# Patient Record
Sex: FEMALE | Race: WHITE | NOT HISPANIC OR LATINO | Employment: FULL TIME | URBAN - METROPOLITAN AREA
[De-identification: names, ages, dates, MRNs, and addresses within clinical notes are randomized per-mention and may not be internally consistent; named-entity substitution may affect disease eponyms.]

---

## 2024-07-11 ENCOUNTER — TELEPHONE (OUTPATIENT)
Dept: ENDOCRINOLOGY | Facility: CLINIC | Age: 34
End: 2024-07-11
Payer: COMMERCIAL

## 2024-09-30 ENCOUNTER — APPOINTMENT (OUTPATIENT)
Dept: ALLERGY | Facility: CLINIC | Age: 34
End: 2024-09-30
Payer: COMMERCIAL

## 2024-09-30 ENCOUNTER — LAB (OUTPATIENT)
Dept: LAB | Facility: LAB | Age: 34
End: 2024-09-30
Payer: COMMERCIAL

## 2024-09-30 VITALS
SYSTOLIC BLOOD PRESSURE: 108 MMHG | WEIGHT: 199 LBS | TEMPERATURE: 98.3 F | HEIGHT: 63 IN | OXYGEN SATURATION: 98 % | RESPIRATION RATE: 17 BRPM | BODY MASS INDEX: 35.26 KG/M2 | DIASTOLIC BLOOD PRESSURE: 68 MMHG | HEART RATE: 89 BPM

## 2024-09-30 DIAGNOSIS — H10.10 ALLERGIC CONJUNCTIVITIS AND RHINITIS, UNSPECIFIED LATERALITY: ICD-10-CM

## 2024-09-30 DIAGNOSIS — K90.49 FOOD INTOLERANCE: ICD-10-CM

## 2024-09-30 DIAGNOSIS — J30.9 ALLERGIC CONJUNCTIVITIS AND RHINITIS, UNSPECIFIED LATERALITY: ICD-10-CM

## 2024-09-30 DIAGNOSIS — Z87.898 HISTORY OF ITCHING: ICD-10-CM

## 2024-09-30 DIAGNOSIS — L50.9 URTICARIA: ICD-10-CM

## 2024-09-30 DIAGNOSIS — L50.9 URTICARIA: Primary | ICD-10-CM

## 2024-09-30 PROCEDURE — 99204 OFFICE O/P NEW MOD 45 MIN: CPT | Performed by: ALLERGY & IMMUNOLOGY

## 2024-09-30 PROCEDURE — 82785 ASSAY OF IGE: CPT

## 2024-09-30 PROCEDURE — 86003 ALLG SPEC IGE CRUDE XTRC EA: CPT

## 2024-09-30 PROCEDURE — 83520 IMMUNOASSAY QUANT NOS NONAB: CPT

## 2024-09-30 PROCEDURE — 36415 COLL VENOUS BLD VENIPUNCTURE: CPT

## 2024-09-30 RX ORDER — CETIRIZINE HYDROCHLORIDE 10 MG/1
1 TABLET ORAL DAILY
COMMUNITY

## 2024-09-30 RX ORDER — DICYCLOMINE HYDROCHLORIDE 10 MG/1
1 CAPSULE ORAL 3 TIMES DAILY
COMMUNITY
Start: 2024-09-20

## 2024-09-30 RX ORDER — PSYLLIUM HUSK 0.4 G
1 CAPSULE ORAL 3 TIMES DAILY
COMMUNITY
Start: 2024-07-10

## 2024-09-30 NOTE — PROGRESS NOTES
Patient ID: Kelsey Choe is a 34 y.o. female.     Chief Complaint: NPV her NP in [place] (Alivia Nelson).  She is her with her boyfriend today.  History Of Present Illness  Kelsey Choe is a 34 y.o. female with PMx allergy symptoms and concern for shrimp allergy presenting for consultation.       Food Allergy  Patient had a history of general blood panel due to GI upset and was told she had a shrimp allergy.  She has never had shrimp and she does not like seafood.  She was prescribed an epi pen. It is .    She also has lactose intolerance and avoids dairy for the most part.    Eczema/ Atopic Dermatitis  Hives in mid August-went to urgent care.  RX for steroids. Patient did not take. Hives went away after 36 hours.  The rash was NOT pruritic.    Asthma  No    Rhinoconjunctivitis  Congestion and rhinorrhea/sneeze spring  Takes Claritin daily for itch and allergy symptoms.  She does state that if she goes off her medication, her skin is itchy.      Drug Allergy   No    Insect Allergy   No    Infections  No history of frequent or recurrent infections    She does have a history of endocrine consult for thyroid nodules and had labs for this today at [facility].      Review of Systems    Pertinent positives and negatives have been assessed in the HPI. All other systems have been reviewed and are negative except as noted in the HPI.    Allergies  Lactose    Past Medical History  She has a past medical history of Personal history of other infectious and parasitic diseases.    Family History  No family history on file.    No history of food allergy or atopic disease in the family.    Surgical History  She has no past surgical history on file.    Social/Environmental History  She has no history on file for tobacco use, alcohol use, and drug use.    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  Dr. James and Juan Carlos.      MEDICATIONS  Current Outpatient Medications on File Prior to Visit   Medication Sig Dispense Refill    cetirizine (ZyrTEC) 10 mg tablet Take 1 tablet (10 mg) by mouth once daily.      dicyclomine (Bentyl) 10 mg capsule Take 1 capsule (10 mg) by mouth 3 times a day.      MetamuciL 0.4 gram capsule Take 1 capsule by mouth 3 times a day.       No current facility-administered medications on file prior to visit.         Physical Exam  Visit Vitals  /68   Pulse 89   Temp 36.8 °C (98.3 °F) (Temporal)   Resp 17   Ht 1.6 m (5' 3\")   Wt 90.3 kg (199 lb)   SpO2 98%   BMI 35.25 kg/m²   BSA 2 m²       Wt Readings from Last 1 Encounters:   09/30/24 90.3 kg (199 lb)       Physical Exam    General: Well appearing, no acute distress  Head: Normocephalic, atraumatic, neck supple without lymphadenopathy  Eyes: EOMI, non-injected  Nose: No nasal crease, nares patent, slightly boggy turbinates, minimal discharge  Throat: Normal dentition, no erythema  Heart: Regular rate and rhythm  Lungs: Clear to auscultation bilaterally, effort normal  Abdomen: Soft, non-tender, normal bowel sounds  Extremities: Moves all extremities symmetrically, no edema  Skin: No rashes/lesions  Psych: normal mood and affect    LAB RESULTS:  CBC:  Recent Labs     05/25/24  0921   WBC 5.6   HGB 15.1   HCT 44.1      MCV 87   EOSABS 0.18       CMP:  Recent Labs     05/25/24  0921      K 4.2      CO2 27   ANIONGAP 10   BUN 10   CREATININE 0.68   EGFR >90     Recent Labs     05/25/24  0921   ALBUMIN 4.3   ALKPHOS 53   ALT 23   AST 15   BILITOT 0.8         Recent Labs     05/25/24  0921   EOSABS 0.18       HEME/ENDO:  Recent Labs     05/25/24  0921   TSH 2.21     Assessment/Plan   Kelsey is a 33 yo with a history of pruritic skin in general, non pruritic rash in the past, abnormal lab for shrimp allergy anf concern for allergic rhinitis. She does not feel she can go off of her " antihistamine as she will be too itchy.  I will obtain labs and call results and then determine if additional testing was needed.    Karoline Trevizo, DO

## 2024-10-01 LAB
A ALTERNATA IGE QN: <0.1 KU/L
A FUMIGATUS IGE QN: <0.1 KU/L
BERMUDA GRASS IGE QN: <0.1 KU/L
BOXELDER IGE QN: <0.1 KU/L
C HERBARUM IGE QN: <0.1 KU/L
CALIF WALNUT POLN IGE QN: <0.1 KU/L
CAT DANDER IGE QN: <0.1 KU/L
CMN PIGWEED IGE QN: <0.1 KU/L
COMMON RAGWEED IGE QN: <0.1 KU/L
COTTONWOOD IGE QN: <0.1 KU/L
D FARINAE IGE QN: <0.1 KU/L
D PTERONYSS IGE QN: <0.1 KU/L
DOG DANDER IGE QN: <0.1 KU/L
ENGL PLANTAIN IGE QN: <0.1 KU/L
GOOSEFOOT IGE QN: <0.1 KU/L
JOHNSON GRASS IGE QN: <0.1 KU/L
KENT BLUE GRASS IGE QN: <0.1 KU/L
LONDON PLANE IGE QN: <0.1 KU/L
MT JUNIPER IGE QN: <0.1 KU/L
P NOTATUM IGE QN: <0.1 KU/L
PECAN/HICK TREE IGE QN: <0.1 KU/L
ROACH IGE QN: 0.22 KU/L
SALTWORT IGE QN: <0.1 KU/L
SHEEP SORREL IGE QN: <0.1 KU/L
SHRIMP IGE QN: 0.23 KU/L
SILVER BIRCH IGE QN: <0.1 KU/L
TIMOTHY IGE QN: <0.1 KU/L
TOTAL IGE SMQN RAST: 147 KU/L
WHITE ASH IGE QN: <0.1 KU/L
WHITE ELM IGE QN: <0.1 KU/L
WHITE MULBERRY IGE QN: <0.1 KU/L
WHITE OAK IGE QN: <0.1 KU/L

## 2024-10-03 LAB — TRYPTASE SERPL-MCNC: 14.9 UG/L

## 2024-10-09 DIAGNOSIS — Z91.013 SHRIMP ALLERGY: Primary | ICD-10-CM

## 2024-10-09 RX ORDER — EPINEPHRINE 0.3 MG/.3ML
0.3 INJECTION SUBCUTANEOUS AS NEEDED
Qty: 2 EACH | Refills: 1 | Status: SHIPPED | OUTPATIENT
Start: 2024-10-09 | End: 2024-11-08

## 2024-11-07 ENCOUNTER — APPOINTMENT (OUTPATIENT)
Dept: ALLERGY | Facility: CLINIC | Age: 34
End: 2024-11-07
Payer: COMMERCIAL

## 2024-11-07 VITALS
SYSTOLIC BLOOD PRESSURE: 122 MMHG | TEMPERATURE: 98.1 F | RESPIRATION RATE: 17 BRPM | DIASTOLIC BLOOD PRESSURE: 76 MMHG | BODY MASS INDEX: 35.08 KG/M2 | HEIGHT: 63 IN | OXYGEN SATURATION: 98 % | HEART RATE: 93 BPM | WEIGHT: 198 LBS

## 2024-11-07 DIAGNOSIS — R74.8 ELEVATED SERUM TRYPTASE: Primary | ICD-10-CM

## 2024-11-07 DIAGNOSIS — L50.9 URTICARIA: ICD-10-CM

## 2024-11-07 DIAGNOSIS — R19.7 DIARRHEA, UNSPECIFIED TYPE: ICD-10-CM

## 2024-11-07 DIAGNOSIS — Z91.013 SHRIMP ALLERGY: ICD-10-CM

## 2024-11-07 PROCEDURE — 99213 OFFICE O/P EST LOW 20 MIN: CPT | Performed by: ALLERGY & IMMUNOLOGY

## 2024-11-07 PROCEDURE — 3008F BODY MASS INDEX DOCD: CPT | Performed by: ALLERGY & IMMUNOLOGY

## 2024-11-07 NOTE — PROGRESS NOTES
Patient ID: Kelsey Choe is a 34 y.o. female.     Chief Complaint: follow up  History Of Present Illness  Kelsey Choe is a 34 y.o. female with PMx allergy symptoms,hives, concern for shrimp allergypresenting for follow up labs.         Food Allergy  Avoids shrimp-now has epi pen. Immunocap low positive    Eczema/ Atopic Dermatitis  Intermittent hives  No recent hives  Found to have slightly elevated tryptase  Does have history of GI distress, but not clear if ibs or other wise. Never saw GI for this.    Asthma  No    Rhinoconjunctivitis  Intermittent wihtout specific allergy on immunocap    Drug Allergy   no    Insect Allergy   No    Infections  No history of frequent or recurrent infections      Review of Systems    Pertinent positives and negatives have been assessed in the HPI. All other systems have been reviewed and are negative except as noted in the HPI.    Allergies  Lactose    Past Medical History  She has a past medical history of Personal history of other infectious and parasitic diseases.    Family History  No family history on file.      Surgical History  She has no past surgical history on file.    Social/Environmental History  She has no history on file for tobacco use, alcohol use, and drug use.    MEDICATIONS  Current Outpatient Medications on File Prior to Visit   Medication Sig Dispense Refill    cetirizine (ZyrTEC) 10 mg tablet Take 1 tablet (10 mg) by mouth once daily.      dicyclomine (Bentyl) 10 mg capsule Take 1 capsule (10 mg) by mouth 3 times a day.      EPINEPHrine (Epipen) 0.3 mg/0.3 mL injection syringe Inject 0.3 mL (0.3 mg) into the muscle if needed for anaphylaxis (shrimp allergy). Inject into UPPER, OUTER THIGH. Call 911 after use. 2 each 1    MetamuciL 0.4 gram capsule Take 1 capsule by mouth 3 times a day.       No current facility-administered medications on file prior to visit.         Physical Exam  Visit Vitals  /76   Pulse 93   Temp 36.7 °C (98.1 °F) (Temporal)  "  Resp 17   Ht 1.6 m (5' 3\")   Wt 89.8 kg (198 lb)   SpO2 98%   BMI 35.07 kg/m²   BSA 2 m²       Wt Readings from Last 1 Encounters:   11/07/24 89.8 kg (198 lb)       Physical Exam    General: Well appearing, no acute distress  Head: Normocephalic, atraumatic  Eyes: non-injected  Nose: No nasal crease, nares patent, minimal discharge  Throat: Normal dentition  Lungs: effort normal  Extremities: Moves all extremities symmetrically, no edema  Skin: No rashes/lesions  Psych: normal mood and affect    LAB RESULTS:  CBC:  Recent Labs     05/25/24  0921   WBC 5.6   HGB 15.1   HCT 44.1      MCV 87   EOSABS 0.18       CMP:  Recent Labs     05/25/24  0921      K 4.2      CO2 27   ANIONGAP 10   BUN 10   CREATININE 0.68   EGFR >90     Recent Labs     05/25/24 0921   ALBUMIN 4.3   ALKPHOS 53   ALT 23   AST 15   BILITOT 0.8       ALLERGY:   Lab Results   Component Value Date    ICIGE 147 09/30/2024    WHITEASH <0.10 09/30/2024    SILVERBIRCH <0.10 09/30/2024    BOXELDER <0.10 09/30/2024    MOUNTJUNIPER <0.10 09/30/2024    COTTONWOOD <0.10 09/30/2024    ELM <0.10 09/30/2024    MULBERRY <0.10 09/30/2024    PECANHICKORY <0.10 09/30/2024    MAPLESYCAMOR <0.10 09/30/2024    OAK <0.10 09/30/2024    BERMUDAGR <0.10 09/30/2024    JOHNSONGR <0.10 09/30/2024    BLUEGRASS <0.10 09/30/2024    TIMOTHYGRASS <0.10 09/30/2024     Lab Results   Component Value Date    LAMBQUART <0.10 09/30/2024    PIGWEED <0.10 09/30/2024    COMRAGWEED <0.10 09/30/2024    RUSSIANT <0.10 09/30/2024    SHEEPSOR <0.10 09/30/2024    PLANTAIN <0.10 09/30/2024    CATEPI <0.10 09/30/2024    DOGEPI <0.10 09/30/2024    ALTERNA <0.10 09/30/2024    CLADHERB <0.10 09/30/2024    ICA04 <0.10 09/30/2024    PENICILLIUM <0.10 09/30/2024    DERMFAR <0.10 09/30/2024    DERMPTE <0.10 09/30/2024    COCKR 0.22 (Equiv IgE) 09/30/2024     Lab Results   Component Value Date    WALNUT <0.10 09/30/2024    SHRIMP 0.23 (Equiv IgE) 09/30/2024     Recent Labs     " 09/30/24  1506   ICIGE 147     Recent Labs     05/25/24  0921   EOSABS 0.18     Recent Labs     09/30/24  1506   TRYPTASE 14.9*         HEME/ENDO:  Recent Labs     05/25/24  0921   TSH 2.21     Assessment/Plan   Kelsey is a 33 yo woman with history of gi distress, shrimp allergy, and elevated tryptase.  -we discussed allergy avoidance and need for epi pen  -referred to gi due to complaint of chronic diarrhea  -Will repeat tryptase in a few month and have follow up. We did discuss additional testing and dietary considerations.        Karoline Trevizo DO

## 2025-01-17 ENCOUNTER — LAB (OUTPATIENT)
Dept: LAB | Facility: LAB | Age: 35
End: 2025-01-17
Payer: COMMERCIAL

## 2025-01-17 ENCOUNTER — OFFICE VISIT (OUTPATIENT)
Dept: GASTROENTEROLOGY | Facility: CLINIC | Age: 35
End: 2025-01-17
Payer: COMMERCIAL

## 2025-01-17 VITALS
DIASTOLIC BLOOD PRESSURE: 84 MMHG | SYSTOLIC BLOOD PRESSURE: 132 MMHG | TEMPERATURE: 97.3 F | BODY MASS INDEX: 35.79 KG/M2 | HEIGHT: 63 IN | WEIGHT: 202 LBS | HEART RATE: 90 BPM

## 2025-01-17 DIAGNOSIS — D89.40 MAST CELL ACTIVATION (MULTI): ICD-10-CM

## 2025-01-17 DIAGNOSIS — D89.40 MAST CELL ACTIVATION (MULTI): Primary | ICD-10-CM

## 2025-01-17 DIAGNOSIS — R19.7 DIARRHEA, UNSPECIFIED TYPE: ICD-10-CM

## 2025-01-17 PROCEDURE — 3008F BODY MASS INDEX DOCD: CPT | Performed by: NURSE PRACTITIONER

## 2025-01-17 PROCEDURE — 83516 IMMUNOASSAY NONANTIBODY: CPT

## 2025-01-17 PROCEDURE — 1036F TOBACCO NON-USER: CPT | Performed by: NURSE PRACTITIONER

## 2025-01-17 PROCEDURE — 86003 ALLG SPEC IGE CRUDE XTRC EA: CPT

## 2025-01-17 PROCEDURE — 82784 ASSAY IGA/IGD/IGG/IGM EACH: CPT

## 2025-01-17 PROCEDURE — 99204 OFFICE O/P NEW MOD 45 MIN: CPT | Performed by: NURSE PRACTITIONER

## 2025-01-17 PROCEDURE — 99214 OFFICE O/P EST MOD 30 MIN: CPT | Performed by: NURSE PRACTITIONER

## 2025-01-17 ASSESSMENT — ENCOUNTER SYMPTOMS
PSYCHIATRIC NEGATIVE: 1
CARDIOVASCULAR NEGATIVE: 1
ALLERGIC/IMMUNOLOGIC NEGATIVE: 1
CONSTITUTIONAL NEGATIVE: 1
MUSCULOSKELETAL NEGATIVE: 1
EYES NEGATIVE: 1
NEUROLOGICAL NEGATIVE: 1
HEMATOLOGIC/LYMPHATIC NEGATIVE: 1
RESPIRATORY NEGATIVE: 1
ENDOCRINE NEGATIVE: 1
ABDOMINAL PAIN: 1

## 2025-01-17 NOTE — PATIENT INSTRUCTIONS
Mast cell activation syndrome-  I would recommend getting labs for celiac , food allergy panel and hydrogen breath test to assess for small bowel intestinal overgrowth as well as a sucrose deficency breath test.    I will contact you with your results and determine follow up

## 2025-01-17 NOTE — PROGRESS NOTES
Subjective   Patient ID: Kelsey Choe is a 34 y.o. female who presents for New Patient Visit.  HPI  34-year-old female for new patient visit for evaluation of IBS and possible mast cell activation syndrome  Medical history includes eczema, atopic dermatitis, allergies also to tryptase and shrimp  FHX; no celiac, mom with digestive issues  Labs reviewed 5/25/2024 normal CMP, CBC, B12 and TSH  Saw allergy/ immunologist and was diagnosed with MCAS  Doesn't do dairy  Avoids gluteen   No issues with fruits or sugars  Doesn't eat fish - doesn't like it  No issues with chicken or beef  BM; regular if no dairy or gluteen    Review of Systems   Constitutional: Negative.    HENT: Negative.     Eyes: Negative.    Respiratory: Negative.     Cardiovascular: Negative.    Gastrointestinal:  Positive for abdominal pain.   Endocrine: Negative.    Genitourinary: Negative.    Musculoskeletal: Negative.    Skin: Negative.    Allergic/Immunologic: Negative.    Neurological: Negative.    Hematological: Negative.    Psychiatric/Behavioral: Negative.         Objective   Physical Exam  Constitutional:       Appearance: Normal appearance.   HENT:      Head: Normocephalic.      Nose: Nose normal.      Mouth/Throat:      Mouth: Mucous membranes are moist.   Eyes:      Pupils: Pupils are equal, round, and reactive to light.   Cardiovascular:      Rate and Rhythm: Normal rate and regular rhythm.      Pulses: Normal pulses.      Heart sounds: Normal heart sounds.   Pulmonary:      Effort: Pulmonary effort is normal.      Breath sounds: Normal breath sounds.   Abdominal:      General: Bowel sounds are normal.      Palpations: Abdomen is soft.   Musculoskeletal:         General: Normal range of motion.      Cervical back: Normal range of motion and neck supple.   Skin:     General: Skin is warm and dry.   Neurological:      Mental Status: She is alert.   Psychiatric:         Mood and Affect: Mood normal.         Assessment/Plan        Mast cell  activation syndrome-  I would recommend getting labs for celiac , food allergy panel and hydrogen breath test to assess for small bowel intestinal overgrowth as well as a sucrose deficency breath test.    I will contact you with your results and determine follow up    TANIKA Olivarez 01/17/25 2:58 PM

## 2025-01-18 LAB
CLAM IGE QN: <0.1 KU/L
CODFISH IGE QN: <0.1 KU/L
CORN IGE QN: <0.1
EGG WHITE IGE QN: <0.1 KU/L
GLIADIN PEPTIDE IGA SER IA-ACNC: 75.7 U/ML
IGA SERPL-MCNC: 245 MG/DL (ref 70–400)
MILK IGE QN: 0.2 KU/L
PEANUT IGE QN: <0.1 KU/L
SCALLOP IGE QN: <0.1 KU/L
SESAME SEED IGE QN: <0.1 KU/L
SHRIMP IGE QN: 0.22 KU/L
SOYBEAN IGE QN: <0.1 KU/L
TTG IGA SER IA-ACNC: 178.2 U/ML
WALNUT IGE QN: <0.1 KU/L
WHEAT IGE QN: <0.1 KU/L

## 2025-01-20 DIAGNOSIS — R89.4 ABNORMAL CELIAC ANTIBODY PANEL: Primary | ICD-10-CM

## 2025-01-20 LAB
GLIADIN PEPTIDE IGG SER IA-ACNC: 19.75 FLU (ref 0–4.99)
TTG IGG SER IA-ACNC: 1.47 FLU (ref 0–4.99)

## 2025-01-22 ENCOUNTER — APPOINTMENT (OUTPATIENT)
Dept: ENDOCRINOLOGY | Facility: CLINIC | Age: 35
End: 2025-01-22
Payer: COMMERCIAL

## 2025-01-22 LAB
DQA1*05: POSITIVE
DQB1*02:01: POSITIVE
DQB1*02:02: POSITIVE
DQB1*03:02: NEGATIVE
HLA RESULTS: NORMAL

## 2025-01-31 ENCOUNTER — TELEPHONE (OUTPATIENT)
Facility: CLINIC | Age: 35
End: 2025-01-31
Payer: COMMERCIAL

## 2025-01-31 NOTE — TELEPHONE ENCOUNTER
Order placed by Uriel for EGD  Pt would like this done at [facility] instead of [facility] - currently scheduled for march   Please advise

## 2025-02-25 ENCOUNTER — ANESTHESIA EVENT (OUTPATIENT)
Dept: GASTROENTEROLOGY | Facility: HOSPITAL | Age: 35
End: 2025-02-25

## 2025-02-25 ENCOUNTER — PREP FOR PROCEDURE (OUTPATIENT)
Facility: CLINIC | Age: 35
End: 2025-02-25
Payer: COMMERCIAL

## 2025-02-27 ENCOUNTER — APPOINTMENT (OUTPATIENT)
Dept: GASTROENTEROLOGY | Facility: HOSPITAL | Age: 35
End: 2025-02-27
Payer: COMMERCIAL

## 2025-02-27 ENCOUNTER — ANESTHESIA (OUTPATIENT)
Dept: GASTROENTEROLOGY | Facility: HOSPITAL | Age: 35
End: 2025-02-27

## 2025-03-18 ENCOUNTER — APPOINTMENT (OUTPATIENT)
Dept: GASTROENTEROLOGY | Facility: HOSPITAL | Age: 35
End: 2025-03-18
Payer: COMMERCIAL

## 2025-03-20 LAB — TRYPTASE SERPL-MCNC: 11.6 MCG/L

## 2025-04-17 ENCOUNTER — HOSPITAL ENCOUNTER (OUTPATIENT)
Dept: GASTROENTEROLOGY | Facility: HOSPITAL | Age: 35
Discharge: HOME | End: 2025-04-17
Payer: COMMERCIAL

## 2025-04-17 ENCOUNTER — ANESTHESIA (OUTPATIENT)
Dept: GASTROENTEROLOGY | Facility: HOSPITAL | Age: 35
End: 2025-04-17
Payer: COMMERCIAL

## 2025-04-17 ENCOUNTER — ANESTHESIA EVENT (OUTPATIENT)
Dept: GASTROENTEROLOGY | Facility: HOSPITAL | Age: 35
End: 2025-04-17
Payer: COMMERCIAL

## 2025-04-17 VITALS
OXYGEN SATURATION: 96 % | SYSTOLIC BLOOD PRESSURE: 135 MMHG | RESPIRATION RATE: 16 BRPM | HEART RATE: 77 BPM | TEMPERATURE: 98.3 F | DIASTOLIC BLOOD PRESSURE: 93 MMHG

## 2025-04-17 DIAGNOSIS — R89.4 ABNORMAL CELIAC ANTIBODY PANEL: ICD-10-CM

## 2025-04-17 LAB — PREGNANCY TEST URINE, POC: NEGATIVE

## 2025-04-17 PROCEDURE — 3700000001 HC GENERAL ANESTHESIA TIME - INITIAL BASE CHARGE

## 2025-04-17 PROCEDURE — 2500000004 HC RX 250 GENERAL PHARMACY W/ HCPCS (ALT 636 FOR OP/ED): Performed by: NURSE ANESTHETIST, CERTIFIED REGISTERED

## 2025-04-17 PROCEDURE — 3700000002 HC GENERAL ANESTHESIA TIME - EACH INCREMENTAL 1 MINUTE

## 2025-04-17 PROCEDURE — 43239 EGD BIOPSY SINGLE/MULTIPLE: CPT | Performed by: INTERNAL MEDICINE

## 2025-04-17 PROCEDURE — 7100000010 HC PHASE TWO TIME - EACH INCREMENTAL 1 MINUTE

## 2025-04-17 PROCEDURE — 7100000009 HC PHASE TWO TIME - INITIAL BASE CHARGE

## 2025-04-17 RX ORDER — PROPOFOL 10 MG/ML
INJECTION, EMULSION INTRAVENOUS AS NEEDED
Status: DISCONTINUED | OUTPATIENT
Start: 2025-04-17 | End: 2025-04-17

## 2025-04-17 RX ORDER — FENTANYL CITRATE 50 UG/ML
INJECTION, SOLUTION INTRAMUSCULAR; INTRAVENOUS AS NEEDED
Status: DISCONTINUED | OUTPATIENT
Start: 2025-04-17 | End: 2025-04-17

## 2025-04-17 RX ORDER — LIDOCAINE HYDROCHLORIDE 20 MG/ML
INJECTION, SOLUTION INFILTRATION; PERINEURAL AS NEEDED
Status: DISCONTINUED | OUTPATIENT
Start: 2025-04-17 | End: 2025-04-17

## 2025-04-17 RX ORDER — SODIUM CHLORIDE 9 MG/ML
100 INJECTION, SOLUTION INTRAVENOUS CONTINUOUS
Status: DISCONTINUED | OUTPATIENT
Start: 2025-04-17 | End: 2025-04-18 | Stop reason: HOSPADM

## 2025-04-17 RX ADMIN — FENTANYL CITRATE 25 MCG: 50 INJECTION INTRAMUSCULAR; INTRAVENOUS at 12:52

## 2025-04-17 RX ADMIN — PROPOFOL 100 MG: 10 INJECTION, EMULSION INTRAVENOUS at 12:47

## 2025-04-17 RX ADMIN — PROPOFOL 50 MG: 10 INJECTION, EMULSION INTRAVENOUS at 12:49

## 2025-04-17 RX ADMIN — LIDOCAINE HYDROCHLORIDE 2 ML: 20 INJECTION, SOLUTION INFILTRATION; PERINEURAL at 12:47

## 2025-04-17 RX ADMIN — FENTANYL CITRATE 50 MCG: 50 INJECTION INTRAMUSCULAR; INTRAVENOUS at 12:47

## 2025-04-17 RX ADMIN — PROPOFOL 50 MG: 10 INJECTION, EMULSION INTRAVENOUS at 12:52

## 2025-04-17 RX ADMIN — FENTANYL CITRATE 25 MCG: 50 INJECTION INTRAMUSCULAR; INTRAVENOUS at 12:49

## 2025-04-17 RX ADMIN — SODIUM CHLORIDE 100 ML/HR: 9 INJECTION, SOLUTION INTRAVENOUS at 12:22

## 2025-04-17 SDOH — HEALTH STABILITY: MENTAL HEALTH: CURRENT SMOKER: 1

## 2025-04-17 ASSESSMENT — COLUMBIA-SUICIDE SEVERITY RATING SCALE - C-SSRS
6. HAVE YOU EVER DONE ANYTHING, STARTED TO DO ANYTHING, OR PREPARED TO DO ANYTHING TO END YOUR LIFE?: NO
1. IN THE PAST MONTH, HAVE YOU WISHED YOU WERE DEAD OR WISHED YOU COULD GO TO SLEEP AND NOT WAKE UP?: NO
2. HAVE YOU ACTUALLY HAD ANY THOUGHTS OF KILLING YOURSELF?: NO

## 2025-04-17 ASSESSMENT — PAIN - FUNCTIONAL ASSESSMENT
PAIN_FUNCTIONAL_ASSESSMENT: 0-10

## 2025-04-17 ASSESSMENT — PAIN SCALES - GENERAL
PAINLEVEL_OUTOF10: 0 - NO PAIN
PAIN_LEVEL: 0
PAINLEVEL_OUTOF10: 0 - NO PAIN

## 2025-04-17 NOTE — ANESTHESIA POSTPROCEDURE EVALUATION
Patient: Kelsey Choe    Procedure Summary       Date: 04/17/25 Room / Location: [facility]    Anesthesia Start: 1241 Anesthesia Stop: 1259    Procedure: EGD Diagnosis: Abnormal celiac antibody panel    Scheduled Providers: Chirag Avendano DO Responsible Provider: SAEED Hurd    Anesthesia Type: MAC ASA Status: 2            Anesthesia Type: MAC    Vitals Value Taken Time   /93 04/17/25 13:17   Temp 36.8 °C (98.3 °F) 04/17/25 13:17   Pulse 77 04/17/25 13:17   Resp 16 04/17/25 13:17   SpO2 96 % 04/17/25 13:17       Anesthesia Post Evaluation    Patient location during evaluation: bedside  Patient participation: complete - patient participated  Level of consciousness: awake and alert  Pain score: 0  Pain management: adequate  Airway patency: patent  Cardiovascular status: stable  Respiratory status: acceptable  Hydration status: acceptable  Postoperative Nausea and Vomiting: none        There were no known notable events for this encounter.

## 2025-04-17 NOTE — ANESTHESIA PREPROCEDURE EVALUATION
Patient: Kelsey Choe    Procedure Information       Anesthesia Start Date/Time: 04/17/25 1241    Scheduled providers: Chirag Avendano DO    Procedure: EGD    Location:  [facility]            Relevant Problems   Anesthesia (within normal limits)      Cardiac (within normal limits)      Pulmonary (within normal limits)      Neuro (within normal limits)      GI (within normal limits)      /Renal (within normal limits)      Liver (within normal limits)      Endocrine (within normal limits)      Hematology (within normal limits)      Musculoskeletal (within normal limits)      HEENT (within normal limits)      ID (within normal limits)      Skin (within normal limits)      GYN (within normal limits)       Clinical information reviewed:   Tobacco  Allergies  Meds   Med Hx  Surg Hx  OB Status  Fam Hx  Soc   Hx        NPO Detail:  NPO/Void Status  Carbohydrate Drink Given Prior to Surgery? : N  Date of Last Liquid: 04/16/25  Time of Last Liquid: 2000  Date of Last Solid: 04/16/25  Time of Last Solid: 1800  Last Intake Type: Clear fluids  Time of Last Void: 1030         Physical Exam    Airway  Mallampati: II  TM distance: >3 FB  Neck ROM: full  Mouth opening: 3 or more finger widths     Cardiovascular - normal exam  Rhythm: regular  Rate: normal     Dental - normal exam     Pulmonary - normal exam   Abdominal - normal exam           Anesthesia Plan    History of general anesthesia?: yes  History of complications of general anesthesia?: no    ASA 2     MAC     The patient is a current smoker.  Patient was previously instructed to abstain from smoking on day of procedure.  Patient did not smoke on day of procedure.    intravenous induction

## 2025-04-18 ENCOUNTER — APPOINTMENT (OUTPATIENT)
Dept: GASTROENTEROLOGY | Facility: CLINIC | Age: 35
End: 2025-04-18
Payer: COMMERCIAL

## 2025-04-28 ENCOUNTER — APPOINTMENT (OUTPATIENT)
Dept: ALLERGY | Facility: CLINIC | Age: 35
End: 2025-04-28
Payer: COMMERCIAL

## 2025-04-30 LAB
LABORATORY COMMENT REPORT: NORMAL
PATH REPORT.FINAL DX SPEC: NORMAL
PATH REPORT.GROSS SPEC: NORMAL
PATH REPORT.RELEVANT HX SPEC: NORMAL
PATH REPORT.TOTAL CANCER: NORMAL

## 2025-05-19 ENCOUNTER — APPOINTMENT (OUTPATIENT)
Facility: CLINIC | Age: 35
End: 2025-05-19
Payer: COMMERCIAL

## 2025-05-19 VITALS
HEART RATE: 82 BPM | BODY MASS INDEX: 34.91 KG/M2 | SYSTOLIC BLOOD PRESSURE: 135 MMHG | WEIGHT: 197 LBS | OXYGEN SATURATION: 97 % | DIASTOLIC BLOOD PRESSURE: 92 MMHG | HEIGHT: 63 IN

## 2025-05-19 DIAGNOSIS — K90.0 CELIAC DISEASE (HHS-HCC): Primary | ICD-10-CM

## 2025-05-19 PROCEDURE — 1036F TOBACCO NON-USER: CPT | Performed by: NURSE PRACTITIONER

## 2025-05-19 PROCEDURE — 3008F BODY MASS INDEX DOCD: CPT | Performed by: NURSE PRACTITIONER

## 2025-05-19 PROCEDURE — 99215 OFFICE O/P EST HI 40 MIN: CPT | Performed by: NURSE PRACTITIONER

## 2025-05-19 ASSESSMENT — ENCOUNTER SYMPTOMS
FEVER: 0
PALPITATIONS: 0
SHORTNESS OF BREATH: 0
CONFUSION: 0
JOINT SWELLING: 0
BRUISES/BLEEDS EASILY: 0
DIZZINESS: 0
WOUND: 0
COUGH: 0
ROS GI COMMENTS: SEE HPI
WEAKNESS: 0
TROUBLE SWALLOWING: 0
ARTHRALGIAS: 0
DIFFICULTY URINATING: 0
ADENOPATHY: 0
SORE THROAT: 0
CHILLS: 0

## 2025-05-19 NOTE — PATIENT INSTRUCTIONS
Thank you for coming to your appointment today   - I will refer you to a dietician. Continue to follow a gluten free diet   - follow up with me in 3 months     Please call [phone] with any questions or concerns       If you utilize [employer] messages, please understand that these are intended to be used for simple and straightforward medical questions. If you have a more complex question or numerous complaints, an office appointment may be needed.

## 2025-05-19 NOTE — PROGRESS NOTES
Subjective   Patient ID: Kelsey Choe is a 34 y.o. female with PMH of mast cell activation who presents for Follow-up (EGD 4/17/25).     Patient's PCP is CLIVE Herrera-CNP    HPI    Patient initially seen Annika Trevino CNP 1/17/25 for IBS. Celiac panel showed elevations of DGA IgA, TTG IgA. SIBO breath test and sucrose breath test were also ordered. EGD 4/17/25 with Dr. Avendano showed increased intraepithelial lymphocytes, villous blunting, focal cryptitis, and gastric foveolar metaplasia; suggestive of celiac disease.     Patient has been following a GFD to the best of her abilities for about 2 weeks. She has been using an marisela to scan barcodes to check if they are gluten free or not and googling things when necessary. She still has some bad days with diarrhea, bloating, gas, and cramping. She has also had some fatigue. She otherwise denies vomiting, reflux, melena, or hematochezia.         Summary of endoscopies:  - EGD 4/2025 (done for abnormal celiac panel): normal esophagus, normal stomach, normal duodenum. Pathology showed increased intraepithelial lymphocytes, villous blunting, focal cryptitis, and gastric foveolar metaplasia; suggestive of celiac disease.     Social Hx:  Tobacco: none   Etoh: none   Recreational drug use: none   NSAIDs: none       Family Hx:  No GI malignancy, IBD, or pancreatitis     Review of Systems:  Review of Systems   Constitutional:  Negative for chills and fever.   HENT:  Negative for sore throat and trouble swallowing.    Respiratory:  Negative for cough and shortness of breath.    Cardiovascular:  Negative for chest pain and palpitations.   Gastrointestinal:         SEE HPI   Endocrine: Negative for cold intolerance and heat intolerance.   Genitourinary:  Negative for difficulty urinating.   Musculoskeletal:  Negative for arthralgias and joint swelling.   Skin:  Negative for rash and wound.   Neurological:  Negative for dizziness and weakness.   Hematological:   Negative for adenopathy. Does not bruise/bleed easily.   Psychiatric/Behavioral:  Negative for confusion.         Medications:  Prior to Admission medications    Medication Sig Start Date End Date Taking? Authorizing Provider   cetirizine (ZyrTEC) 10 mg tablet Take 1 tablet (10 mg) by mouth once daily.    Historical Provider, MD   dicyclomine (Bentyl) 10 mg capsule Take 1 capsule (10 mg) by mouth 3 times a day. 9/20/24   Historical Provider, MD   EPINEPHrine (Epipen) 0.3 mg/0.3 mL injection syringe Inject 0.3 mL (0.3 mg) into the muscle if needed for anaphylaxis (shrimp allergy). Inject into UPPER, OUTER THIGH. Call 911 after use. 10/9/24 11/8/24  Karoline Pj BrennerstonDO   MetamuciL 0.4 gram capsule Take 1 capsule by mouth 3 times a day. 7/10/24   Historical Provider, MD       Allergies:  Lactose, Shrimp, and Amoxicillin    Past Medical History:  She has a past medical history of Mast cell activation syndrome and Personal history of other infectious and parasitic diseases.    Past Surgical History:  She has no past surgical history on file.    Social History:  She reports that she has never smoked. She has never used smokeless tobacco. She reports current alcohol use of about 1.0 standard drink of alcohol per week. She reports current drug use. Frequency: 3.00 times per week. Drug: Marijuana.    Objective   Physical exam:  Physical Exam  Constitutional:       General: She is not in acute distress.     Appearance: Normal appearance.   HENT:      Mouth/Throat:      Mouth: Mucous membranes are moist.      Comments: pink  Eyes:      Conjunctiva/sclera: Conjunctivae normal.      Pupils: Pupils are equal, round, and reactive to light.   Cardiovascular:      Rate and Rhythm: Normal rate and regular rhythm.      Heart sounds: No murmur heard.  Pulmonary:      Effort: Pulmonary effort is normal.      Breath sounds: Normal breath sounds.   Abdominal:      General: Bowel sounds are normal. There is no distension.       Palpations: Abdomen is soft.      Tenderness: There is abdominal tenderness. There is no guarding.   Skin:     General: Skin is warm and dry.      Coloration: Skin is not jaundiced.   Neurological:      Mental Status: She is alert and oriented to person, place, and time.   Psychiatric:         Mood and Affect: Mood normal.         Behavior: Behavior normal.          Assessment/Plan     Celiac disease   Newly diagnosed with abnormal celiac panel and biopsies that are consistent with celiac disease. Has been following a GFD to the best of her ability, but will refer to a dietitian for assistance in education of gluten free diet. Discussed importance of a GFD with celiac diagnosis. Will check vitamin D and iron levels. Will plan to have patient follow up in about 3 months and will repeat TTG IgA at that time. May consider repeating EGD in about 2 years to check histopathologic healing.     40 minutes spent in total care        CLIVE Juarez-CNP

## 2025-06-06 LAB
25(OH)D3+25(OH)D2 SERPL-MCNC: 20 NG/ML (ref 30–100)
IRON SATN MFR SERPL: 18 % (CALC) (ref 16–45)
IRON SERPL-MCNC: 61 MCG/DL (ref 40–190)
TIBC SERPL-MCNC: 339 MCG/DL (CALC) (ref 250–450)

## 2025-07-09 PROBLEM — K90.0 CELIAC DISEASE (HHS-HCC): Status: ACTIVE | Noted: 2025-07-09

## 2025-07-09 NOTE — PROGRESS NOTES
Nutrition Initial Assessment:     Patient Kelsey Choe is a 34 y.o. female being seen at [facility] who was referred by Uma Haider on 5/19/25 for   1. Celiac disease ([facility])            {Is this a telehealth visit (virtual and/or phone only)? If yes, select drop down and complete the mandatory information. If this is not a telehealth visit, you can skip this and it will appear as a blank space in your final note.:37602}    Nutrition Assessment    Problem List[1]    Nutrition History:  Food & Nutrition History:   Food Allergies: {Food Allergies:67504:x};  Food Intolerances: {Food Intolerance:45094:x}  Vitamin/mineral intake: {Vitamin and Mineral Intake (Optional):83881}  Herbal supplements: {Herbal supplements (Optional):16720}  Medication and Complementary/Alternative Medicine Use:   GI Symptoms: {GI Issues (Optional):99708}  Mouth Issues: {Oral Problems (Optional):52467}; Teeth Issues: {Dentition (Optional):74330}  Sleep Habits: {Sleep Habits:50693:x}    Diet Recall:  Meal 1:   Snack:   Meal 2:   Snack:  Meal 3:   Snack:  Food Variety: {Present/Absent (Optional):53752}  Oral Nutrition Supplement Use: {ONS choices (Optional):27358:x} {Frequency (Optional):94407}  Fluid Intake:   Energy Intake: {Energy Intake (Optional):38034}  {Nutrition Specialties Section. Does the pt need info charted for TF, TPN, OB, Mindful Eating, DM, Disordered Eating)? If Yes, click this smart list. If not, skip & will appear as blank space in note. (Optional):63404}    Food Preparation:  Cooking: {Food preperation (Optional):67617}  Grocery Shopping: {Food preperation (Optional):35886}  Dining Out: {frequency (Optional):27629}    Physical Activity:       Food Insecurity: {Present/Absent (Optional):41035}    Anthropometrics:                            Weight History:   Daily Weight  05/19/25 : 89.4 kg (197 lb)  01/17/25 : 91.6 kg (202 lb)  11/07/24 : 89.8 kg (198 lb)  09/30/24 : 90.3 kg (199 lb)  05/21/21 : 74.8 kg (165  "lb)  12/03/20 : 81.2 kg (179 lb)    Weight Change %:       Nutrition Focused Physical Exam Findings:  Subcutaneous Fat Loss:        Muscle Wasting:       Physical Findings:  Hair:    Eyes:    Nails:    Skin:    Respiratory:    Edema:        Nutrition Significant Labs:  CMP Trend:    Recent Labs     05/25/24  0921   GLUCOSE 95      K 4.2      CO2 27   ANIONGAP 10   BUN 10   CREATININE 0.68   EGFR >90   CALCIUM 8.9   ALBUMIN 4.3   ALKPHOS 53   PROT 6.6   AST 15   BILITOT 0.8   ALT 23   , DM Specific Labs Trend (Includes HgbA1C, antibodies & fasting insulin): No results for input(s): \"HGBA1C\", \"CPEPTIDE\", \"INSULFAST\" in the last 58252 hours.    No lab exists for component: \"BHDRXBUT\", \"QTC2OCJ\", Lipid Panel Trend:  No results for input(s): \"CHOL\", \"HDL\", \"LDLCALC\", \"LDLF\", \"VLDL\", \"TRIG\" in the last 65962 hours., Iron Panel + Serum Ferritin Trend:   Recent Labs     06/05/25  1611   IRON 61   TIBC 339   IRONSAT 18   , Vitamin B12:   Lab Results   Component Value Date    VTEAFRIS99 223 05/25/2024    , Folate: No results found for: \"FOLATE\" , and Vitamin D:   Lab Results   Component Value Date    VITD25 20 (L) 06/05/2025        Medications:  Current Outpatient Medications   Medication Instructions    cetirizine (ZyrTEC) 10 mg tablet 1 tablet, Daily    dicyclomine (Bentyl) 10 mg capsule 1 capsule, 3 times daily    EPINEPHrine (EPIPEN) 0.3 mg, intramuscular, As needed, Inject into UPPER, OUTER THIGH. Call 911 after use.    MetamuciL 0.4 gram capsule 1 capsule, 3 times daily        Estimated Needs:   ;     ;     ;     ;                    Nutrition Diagnosis                Nutrition Interventions/Recommendations   Nutrition Prescription:         Nutrition Interventions:   Food and Nutrient Delivery:         Coordination of Care:       Nutrition Education:         Nutrition Education Topics Discussed:   ***    Educational Handouts Provided: {SR Handouts List:09844}     Nutrition Counseling:      Patient Goals:   "     Readiness to Change: {OPNOTE RDN assessment of patient:05549}   Level of Understanding: {OPNOTE RDN assessment of patient:88580}  Anticipated Compliance: {OPNOTE RDN assessment of patient:46929}       Nutrition Monitoring and Evaluation                                    Follow Up: {OPNOTEFOLLOWUP (Optional):33777}            [1]   Patient Active Problem List  Diagnosis    Celiac disease ([facility])

## 2025-07-14 ENCOUNTER — APPOINTMENT (OUTPATIENT)
Dept: NUTRITION | Facility: CLINIC | Age: 35
End: 2025-07-14
Payer: COMMERCIAL

## 2025-07-14 DIAGNOSIS — K90.0 CELIAC DISEASE (HHS-HCC): Primary | ICD-10-CM

## 2025-09-04 ENCOUNTER — APPOINTMENT (OUTPATIENT)
Dept: PRIMARY CARE | Facility: CLINIC | Age: 35
End: 2025-09-04
Payer: COMMERCIAL

## 2025-09-04 PROBLEM — Z71.3 DIETARY COUNSELING AND SURVEILLANCE: Status: ACTIVE | Noted: 2025-09-04
